# Patient Record
Sex: MALE | Race: OTHER | HISPANIC OR LATINO | URBAN - METROPOLITAN AREA
[De-identification: names, ages, dates, MRNs, and addresses within clinical notes are randomized per-mention and may not be internally consistent; named-entity substitution may affect disease eponyms.]

---

## 2017-12-06 ENCOUNTER — EMERGENCY (EMERGENCY)
Facility: HOSPITAL | Age: 57
LOS: 1 days | Discharge: HOME | End: 2017-12-06
Attending: EMERGENCY MEDICINE | Admitting: EMERGENCY MEDICINE
Payer: COMMERCIAL

## 2017-12-06 VITALS
DIASTOLIC BLOOD PRESSURE: 92 MMHG | HEART RATE: 112 BPM | OXYGEN SATURATION: 96 % | SYSTOLIC BLOOD PRESSURE: 140 MMHG | RESPIRATION RATE: 22 BRPM | TEMPERATURE: 99 F

## 2017-12-06 DIAGNOSIS — Z79.899 OTHER LONG TERM (CURRENT) DRUG THERAPY: ICD-10-CM

## 2017-12-06 DIAGNOSIS — J40 BRONCHITIS, NOT SPECIFIED AS ACUTE OR CHRONIC: ICD-10-CM

## 2017-12-06 DIAGNOSIS — J45.901 UNSPECIFIED ASTHMA WITH (ACUTE) EXACERBATION: ICD-10-CM

## 2017-12-06 PROCEDURE — 99285 EMERGENCY DEPT VISIT HI MDM: CPT | Mod: 25

## 2017-12-06 NOTE — ED ADULT TRIAGE NOTE - CHIEF COMPLAINT QUOTE
pt c/o productive cough ( green sputum ) , wheezing , SOB , no chest pain , pt states " I am breathing heavy and last year when I felt like this I had bronchitis "

## 2017-12-07 VITALS
DIASTOLIC BLOOD PRESSURE: 91 MMHG | HEART RATE: 103 BPM | OXYGEN SATURATION: 97 % | TEMPERATURE: 98 F | RESPIRATION RATE: 20 BRPM | SYSTOLIC BLOOD PRESSURE: 147 MMHG

## 2017-12-07 PROCEDURE — 94640 AIRWAY INHALATION TREATMENT: CPT

## 2017-12-07 PROCEDURE — 96374 THER/PROPH/DIAG INJ IV PUSH: CPT

## 2017-12-07 PROCEDURE — 36415 COLL VENOUS BLD VENIPUNCTURE: CPT

## 2017-12-07 PROCEDURE — 71020: CPT | Mod: 26

## 2017-12-07 PROCEDURE — 87040 BLOOD CULTURE FOR BACTERIA: CPT

## 2017-12-07 PROCEDURE — 71046 X-RAY EXAM CHEST 2 VIEWS: CPT

## 2017-12-07 PROCEDURE — 83605 ASSAY OF LACTIC ACID: CPT

## 2017-12-07 PROCEDURE — 99285 EMERGENCY DEPT VISIT HI MDM: CPT | Mod: 25

## 2017-12-07 PROCEDURE — 80053 COMPREHEN METABOLIC PANEL: CPT

## 2017-12-07 PROCEDURE — 85025 COMPLETE CBC W/AUTO DIFF WBC: CPT

## 2017-12-07 RX ORDER — IPRATROPIUM BROMIDE 0.2 MG/ML
500 SOLUTION, NON-ORAL INHALATION ONCE
Qty: 0 | Refills: 0 | Status: COMPLETED | OUTPATIENT
Start: 2017-12-07 | End: 2017-12-07

## 2017-12-07 RX ORDER — ALBUTEROL 90 UG/1
2.5 AEROSOL, METERED ORAL
Qty: 0 | Refills: 0 | Status: COMPLETED | OUTPATIENT
Start: 2017-12-07 | End: 2017-12-07

## 2017-12-07 RX ORDER — ALBUTEROL 90 UG/1
1 AEROSOL, METERED ORAL
Qty: 1 | Refills: 0 | OUTPATIENT
Start: 2017-12-07

## 2017-12-07 RX ORDER — AZITHROMYCIN 500 MG/1
1 TABLET, FILM COATED ORAL
Qty: 3 | Refills: 0 | OUTPATIENT
Start: 2017-12-07 | End: 2017-12-10

## 2017-12-07 RX ORDER — SODIUM CHLORIDE 9 MG/ML
1000 INJECTION INTRAMUSCULAR; INTRAVENOUS; SUBCUTANEOUS ONCE
Qty: 0 | Refills: 0 | Status: COMPLETED | OUTPATIENT
Start: 2017-12-07 | End: 2017-12-07

## 2017-12-07 RX ORDER — AZITHROMYCIN 500 MG/1
500 TABLET, FILM COATED ORAL ONCE
Qty: 0 | Refills: 0 | Status: COMPLETED | OUTPATIENT
Start: 2017-12-07 | End: 2017-12-07

## 2017-12-07 RX ADMIN — ALBUTEROL 2.5 MILLIGRAM(S): 90 AEROSOL, METERED ORAL at 00:45

## 2017-12-07 RX ADMIN — ALBUTEROL 2.5 MILLIGRAM(S): 90 AEROSOL, METERED ORAL at 00:00

## 2017-12-07 RX ADMIN — SODIUM CHLORIDE 1000 MILLILITER(S): 9 INJECTION INTRAMUSCULAR; INTRAVENOUS; SUBCUTANEOUS at 01:17

## 2017-12-07 RX ADMIN — Medication 125 MILLIGRAM(S): at 00:19

## 2017-12-07 RX ADMIN — Medication 500 MICROGRAM(S): at 00:00

## 2017-12-07 RX ADMIN — AZITHROMYCIN 500 MILLIGRAM(S): 500 TABLET, FILM COATED ORAL at 00:19

## 2017-12-07 RX ADMIN — ALBUTEROL 2.5 MILLIGRAM(S): 90 AEROSOL, METERED ORAL at 00:31

## 2017-12-07 NOTE — ED ADULT NURSE NOTE - OBJECTIVE STATEMENT
56 y/o male with hx of bronchitis arrived to [facility] ER reporting productive cough(green sputum), wheezing, SOB for the past couple hours. Pt verbalized that the last time he had these symptoms he had bronchitis last year. Upon assessment, rhonchi noted to lung fields WNL, breathing is equal and unlabored, pulses palpable, no visible injuries noted. Pt denies chest pain, headache, dizziness, blurred vision, slurred speech, nausea, vomiting, diarrhea, fever, chills, LOC, weakness, fatigue, and palpitations. Care in progress. Awaiting disposition

## 2017-12-07 NOTE — ED PROVIDER NOTE - ATTENDING CONTRIBUTION TO CARE
57 yom hx of asthma, pw cough, congestion.  occasional smoker.    agree w/ PA, mild tachycardia (though after nebulizer usage), speaking in full sentences, no rales, no wheezing, no respiratory distress, possibly viral bronchitis exacerbating RAD.

## 2020-01-12 NOTE — ED PROVIDER NOTE - ENMT, MLM
Airway patent, Nasal mucosa clear. Mouth with normal mucosa. Throat has no vesicles, no oropharyngeal exudates and uvula is midline. 11-Jan-2020

## 2024-12-31 ENCOUNTER — INPATIENT (INPATIENT)
Facility: HOSPITAL | Age: 64
LOS: 0 days | Discharge: HOME | DRG: 193 | End: 2025-01-01
Attending: INTERNAL MEDICINE | Admitting: INTERNAL MEDICINE
Payer: COMMERCIAL

## 2024-12-31 VITALS
OXYGEN SATURATION: 90 % | DIASTOLIC BLOOD PRESSURE: 97 MMHG | HEART RATE: 133 BPM | TEMPERATURE: 100 F | SYSTOLIC BLOOD PRESSURE: 138 MMHG | RESPIRATION RATE: 30 BRPM | WEIGHT: 261.91 LBS

## 2024-12-31 DIAGNOSIS — J11.1 INFLUENZA DUE TO UNIDENTIFIED INFLUENZA VIRUS WITH OTHER RESPIRATORY MANIFESTATIONS: ICD-10-CM

## 2024-12-31 DIAGNOSIS — K42.9 UMBILICAL HERNIA WITHOUT OBSTRUCTION OR GANGRENE: ICD-10-CM

## 2024-12-31 DIAGNOSIS — R65.10 SYSTEMIC INFLAMMATORY RESPONSE SYNDROME (SIRS) OF NON-INFECTIOUS ORIGIN WITHOUT ACUTE ORGAN DYSFUNCTION: ICD-10-CM

## 2024-12-31 DIAGNOSIS — I10 ESSENTIAL (PRIMARY) HYPERTENSION: ICD-10-CM

## 2024-12-31 DIAGNOSIS — J45.901 UNSPECIFIED ASTHMA WITH (ACUTE) EXACERBATION: ICD-10-CM

## 2024-12-31 DIAGNOSIS — K40.90 UNILATERAL INGUINAL HERNIA, WITHOUT OBSTRUCTION OR GANGRENE, NOT SPECIFIED AS RECURRENT: ICD-10-CM

## 2024-12-31 LAB
ADD ON TEST-SPECIMEN IN LAB: SIGNIFICANT CHANGE UP
ALBUMIN SERPL ELPH-MCNC: 4.1 G/DL — SIGNIFICANT CHANGE UP (ref 3.3–5)
ALP SERPL-CCNC: 69 U/L — SIGNIFICANT CHANGE UP (ref 40–120)
ALT FLD-CCNC: 24 U/L — SIGNIFICANT CHANGE UP (ref 10–45)
ANION GAP SERPL CALC-SCNC: 11 MMOL/L — SIGNIFICANT CHANGE UP (ref 5–17)
AST SERPL-CCNC: 63 U/L — HIGH (ref 10–40)
BASOPHILS # BLD AUTO: 0.01 K/UL — SIGNIFICANT CHANGE UP (ref 0–0.2)
BASOPHILS NFR BLD AUTO: 0.1 % — SIGNIFICANT CHANGE UP (ref 0–2)
BILIRUB SERPL-MCNC: 0.2 MG/DL — SIGNIFICANT CHANGE UP (ref 0.2–1.2)
BUN SERPL-MCNC: 20 MG/DL — SIGNIFICANT CHANGE UP (ref 7–23)
CALCIUM SERPL-MCNC: 8.7 MG/DL — SIGNIFICANT CHANGE UP (ref 8.4–10.5)
CHLORIDE SERPL-SCNC: 100 MMOL/L — SIGNIFICANT CHANGE UP (ref 96–108)
CO2 SERPL-SCNC: 24 MMOL/L — SIGNIFICANT CHANGE UP (ref 22–31)
CREAT SERPL-MCNC: 1.11 MG/DL — SIGNIFICANT CHANGE UP (ref 0.5–1.3)
EGFR: 75 ML/MIN/1.73M2 — SIGNIFICANT CHANGE UP
EOSINOPHIL # BLD AUTO: 0 K/UL — SIGNIFICANT CHANGE UP (ref 0–0.5)
EOSINOPHIL NFR BLD AUTO: 0 % — SIGNIFICANT CHANGE UP (ref 0–6)
FLUAV AG NPH QL: DETECTED
FLUBV AG NPH QL: SIGNIFICANT CHANGE UP
GAS PNL BLDV: SIGNIFICANT CHANGE UP
GLUCOSE SERPL-MCNC: 102 MG/DL — HIGH (ref 70–99)
HCT VFR BLD CALC: 46 % — SIGNIFICANT CHANGE UP (ref 39–50)
HGB BLD-MCNC: 15.4 G/DL — SIGNIFICANT CHANGE UP (ref 13–17)
IMM GRANULOCYTES NFR BLD AUTO: 0.3 % — SIGNIFICANT CHANGE UP (ref 0–0.9)
LACTATE SERPL-SCNC: 1.9 MMOL/L — SIGNIFICANT CHANGE UP (ref 0.5–2)
LYMPHOCYTES # BLD AUTO: 0.66 K/UL — LOW (ref 1–3.3)
LYMPHOCYTES # BLD AUTO: 8.9 % — LOW (ref 13–44)
MCHC RBC-ENTMCNC: 30.4 PG — SIGNIFICANT CHANGE UP (ref 27–34)
MCHC RBC-ENTMCNC: 33.5 G/DL — SIGNIFICANT CHANGE UP (ref 32–36)
MCV RBC AUTO: 90.9 FL — SIGNIFICANT CHANGE UP (ref 80–100)
MONOCYTES # BLD AUTO: 0.97 K/UL — HIGH (ref 0–0.9)
MONOCYTES NFR BLD AUTO: 13.1 % — SIGNIFICANT CHANGE UP (ref 2–14)
NEUTROPHILS # BLD AUTO: 5.74 K/UL — SIGNIFICANT CHANGE UP (ref 1.8–7.4)
NEUTROPHILS NFR BLD AUTO: 77.6 % — HIGH (ref 43–77)
NRBC # BLD: 0 /100 WBCS — SIGNIFICANT CHANGE UP (ref 0–0)
NT-PROBNP SERPL-SCNC: 58 PG/ML — SIGNIFICANT CHANGE UP (ref 0–300)
PLATELET # BLD AUTO: 317 K/UL — SIGNIFICANT CHANGE UP (ref 150–400)
POTASSIUM SERPL-MCNC: 4 MMOL/L — SIGNIFICANT CHANGE UP (ref 3.5–5.3)
POTASSIUM SERPL-SCNC: 4 MMOL/L — SIGNIFICANT CHANGE UP (ref 3.5–5.3)
PROT SERPL-MCNC: 8.1 G/DL — SIGNIFICANT CHANGE UP (ref 6–8.3)
RBC # BLD: 5.06 M/UL — SIGNIFICANT CHANGE UP (ref 4.2–5.8)
RBC # FLD: 14.4 % — SIGNIFICANT CHANGE UP (ref 10.3–14.5)
RSV RNA NPH QL NAA+NON-PROBE: SIGNIFICANT CHANGE UP
SARS-COV-2 RNA SPEC QL NAA+PROBE: SIGNIFICANT CHANGE UP
SODIUM SERPL-SCNC: 135 MMOL/L — SIGNIFICANT CHANGE UP (ref 135–145)
WBC # BLD: 7.4 K/UL — SIGNIFICANT CHANGE UP (ref 3.8–10.5)
WBC # FLD AUTO: 7.4 K/UL — SIGNIFICANT CHANGE UP (ref 3.8–10.5)

## 2024-12-31 PROCEDURE — 71045 X-RAY EXAM CHEST 1 VIEW: CPT | Mod: 26

## 2024-12-31 PROCEDURE — 99223 1ST HOSP IP/OBS HIGH 75: CPT | Mod: GC

## 2024-12-31 PROCEDURE — 93010 ELECTROCARDIOGRAM REPORT: CPT

## 2024-12-31 PROCEDURE — 74177 CT ABD & PELVIS W/CONTRAST: CPT | Mod: 26,MC

## 2024-12-31 PROCEDURE — 99285 EMERGENCY DEPT VISIT HI MDM: CPT

## 2024-12-31 RX ORDER — PREDNISONE 5 MG
40 TABLET ORAL DAILY
Refills: 0 | Status: DISCONTINUED | OUTPATIENT
Start: 2025-01-01 | End: 2025-01-01

## 2024-12-31 RX ORDER — IPRATROPIUM BROMIDE AND ALBUTEROL SULFATE .5; 2.5 MG/3ML; MG/3ML
3 SOLUTION RESPIRATORY (INHALATION) EVERY 6 HOURS
Refills: 0 | Status: DISCONTINUED | OUTPATIENT
Start: 2024-12-31 | End: 2025-01-01

## 2024-12-31 RX ORDER — SODIUM CHLORIDE 9 MG/ML
1000 INJECTION, SOLUTION INTRAMUSCULAR; INTRAVENOUS; SUBCUTANEOUS ONCE
Refills: 0 | Status: COMPLETED | OUTPATIENT
Start: 2024-12-31 | End: 2024-12-31

## 2024-12-31 RX ORDER — ACETAMINOPHEN 80 MG/.8ML
975 SOLUTION/ DROPS ORAL ONCE
Refills: 0 | Status: COMPLETED | OUTPATIENT
Start: 2024-12-31 | End: 2025-01-01

## 2024-12-31 RX ORDER — MAGNESIUM SULFATE 500 MG/ML
2 INJECTION, SOLUTION INTRAMUSCULAR; INTRAVENOUS ONCE
Refills: 0 | Status: COMPLETED | OUTPATIENT
Start: 2024-12-31 | End: 2024-12-31

## 2024-12-31 RX ORDER — IPRATROPIUM BROMIDE AND ALBUTEROL SULFATE .5; 2.5 MG/3ML; MG/3ML
3 SOLUTION RESPIRATORY (INHALATION) ONCE
Refills: 0 | Status: COMPLETED | OUTPATIENT
Start: 2024-12-31 | End: 2024-12-31

## 2024-12-31 RX ORDER — IOHEXOL 350 MG/ML
30 INJECTION, SOLUTION INTRAVENOUS ONCE
Refills: 0 | Status: COMPLETED | OUTPATIENT
Start: 2024-12-31 | End: 2024-12-31

## 2024-12-31 RX ORDER — METHYLPREDNISOLONE 4 MG/1
125 TABLET ORAL ONCE
Refills: 0 | Status: COMPLETED | OUTPATIENT
Start: 2024-12-31 | End: 2024-12-31

## 2024-12-31 RX ORDER — OSELTAMIVIR 75 MG/1
75 CAPSULE ORAL
Refills: 0 | Status: DISCONTINUED | OUTPATIENT
Start: 2024-12-31 | End: 2025-01-01

## 2024-12-31 RX ORDER — ACETAMINOPHEN 80 MG/.8ML
650 SOLUTION/ DROPS ORAL ONCE
Refills: 0 | Status: COMPLETED | OUTPATIENT
Start: 2024-12-31 | End: 2024-12-31

## 2024-12-31 RX ADMIN — SODIUM CHLORIDE 1000 MILLILITER(S): 9 INJECTION, SOLUTION INTRAMUSCULAR; INTRAVENOUS; SUBCUTANEOUS at 18:42

## 2024-12-31 RX ADMIN — IPRATROPIUM BROMIDE AND ALBUTEROL SULFATE 3 MILLILITER(S): .5; 2.5 SOLUTION RESPIRATORY (INHALATION) at 17:19

## 2024-12-31 RX ADMIN — ACETAMINOPHEN 650 MILLIGRAM(S): 80 SOLUTION/ DROPS ORAL at 18:02

## 2024-12-31 RX ADMIN — MAGNESIUM SULFATE 2 GRAM(S): 500 INJECTION, SOLUTION INTRAMUSCULAR; INTRAVENOUS at 18:02

## 2024-12-31 RX ADMIN — IOHEXOL 30 MILLILITER(S): 350 INJECTION, SOLUTION INTRAVENOUS at 18:40

## 2024-12-31 RX ADMIN — SODIUM CHLORIDE 1000 MILLILITER(S): 9 INJECTION, SOLUTION INTRAMUSCULAR; INTRAVENOUS; SUBCUTANEOUS at 17:29

## 2024-12-31 RX ADMIN — OSELTAMIVIR 75 MILLIGRAM(S): 75 CAPSULE ORAL at 19:52

## 2024-12-31 RX ADMIN — IPRATROPIUM BROMIDE AND ALBUTEROL SULFATE 3 MILLILITER(S): .5; 2.5 SOLUTION RESPIRATORY (INHALATION) at 17:29

## 2024-12-31 RX ADMIN — METHYLPREDNISOLONE 125 MILLIGRAM(S): 4 TABLET ORAL at 17:29

## 2024-12-31 RX ADMIN — MAGNESIUM SULFATE 150 GRAM(S): 500 INJECTION, SOLUTION INTRAMUSCULAR; INTRAVENOUS at 17:29

## 2024-12-31 NOTE — H&P ADULT - NSHPLABSRESULTS_GEN_ALL_CORE
.  LABS:                         15.4   7.40  )-----------( 317      ( 31 Dec 2024 17:39 )             46.0     12-31    135  |  100  |  20  ----------------------------<  102[H]  4.0   |  24  |  1.11    Ca    8.7      31 Dec 2024 17:39    TPro  8.1  /  Alb  4.1  /  TBili  0.2  /  DBili  x   /  AST  63[H]  /  ALT  24  /  AlkPhos  69  12-31      Urinalysis Basic - ( 31 Dec 2024 17:39 )    Color: x / Appearance: x / SG: x / pH: x  Gluc: 102 mg/dL / Ketone: x  / Bili: x / Urobili: x   Blood: x / Protein: x / Nitrite: x   Leuk Esterase: x / RBC: x / WBC x   Sq Epi: x / Non Sq Epi: x / Bacteria: x            Lactate, Blood: 1.9 mmol/L (12-31 @ 17:39)      RADIOLOGY, EKG & ADDITIONAL TESTS: Reviewed.

## 2024-12-31 NOTE — H&P ADULT - PROBLEM SELECTOR PLAN 4
Home meds: pt states that he is on amlodipine, unsure of dose  plan  - will hold acute asthma exacerbation 2/2 parainfluenza s/p Mag 2 mg IV 2,Solumedrol 125 mg IV push x1, Tamiflu 75 mg x1, Nacl 1000 ml, Duonebs, pt intubated as a child, meets SIRS critera 3/4Temp 100.4, , RR 30  plan  - will c/w  Tamiflu 75 mg BID

## 2024-12-31 NOTE — ED PROVIDER NOTE - CARE PLAN
Principal Discharge DX:	Influenza  Secondary Diagnosis:	Acute asthma exacerbation  Secondary Diagnosis:	Umbilical hernia  Secondary Diagnosis:	Hernia, inguinal   1

## 2024-12-31 NOTE — H&P ADULT - PROBLEM SELECTOR PLAN 6
Eliu states that he has a umbilical and inguinal hernia for roughly 20 years, he states that he does not have any abdominal pain, normal bowel and bladder movements, Large right inguinal hernia measuring 12 x 12 cm (3/198) which includes the right ureter (3/165, 5/52). There is mild right hydroureter of the section in the hernia sac as well as mild right hydronephrosis near the ureteropelvic junction without evidence of obstructing stone,  There is mild surrounding omental fat stranding, correlate clinically for incarceration, Left sided fat containing inguinal hernia measuring 4.7 x 4.4 cm  plan  - general surgery consult Eliu states that he has a umbilical and inguinal hernia for roughly 20 years, he states that he does not have any abdominal pain, normal bowel and bladder movements, CT Fat-containing umbilical hernia measuring 6.3 x 9.5 x 9.4 cm. There is mild surrounding omental fat stranding, correlate clinically for incarceration,  plan  - general surgery consult Patinet states that he has a umbilical and inguinal hernia for roughly 20 years, he states that he does not have any abdominal pain, normal bowel and bladder movements, CT Fat-containing umbilical hernia measuring 6.3 x 9.5 x 9.4 cm. There is mild surrounding omental fat stranding, correlate clinically for incarceration,  plan  - f/u with Gen surg was consulted in the ED and will follow during admission, no acute surgical intervention indication.

## 2024-12-31 NOTE — H&P ADULT - NSHPPHYSICALEXAM_GEN_ALL_CORE
CONSTITUTIONAL: Well groomed, no apparent distress  EYES: PERRLA and symmetric, EOMI, No conjunctival or scleral injection, non-icteric  ENMT: Oral mucosa with moist membranes. Normal dentition; no pharyngeal injection or exudates             NECK: Supple, symmetric and without tracheal deviation   RESP: No respiratory distress, no use of accessory muscles; CTA b/l, no WRR  CV: RRR, +S1S2, no MRG; no JVD; no peripheral edema  GI: Soft, NT, ND, no rebound, no guarding; no palpable masses; no hepatosplenomegaly; no hernia palpated  LYMPH: No cervical LAD or tenderness; no axillary LAD or tenderness; no inguinal LAD or tenderness  MSK: Normal gait; No digital clubbing or cyanosis; examination of the (head/neck/spine/ribs/pelvis, RUE, LUE, RLE, LLE) without misalignment,            Normal ROM without pain, no spinal tenderness, normal muscle strength/tone  SKIN: No rashes or ulcers noted; no subcutaneous nodules or induration palpable  NEURO: CN II-XII intact; normal reflexes in upper and lower extremities, sensation intact in upper and lower extremities b/l to light touch   PSYCH: Appropriate insight/judgment; A+O x 3, mood and affect appropriate, recent/remote memory intact CONSTITUTIONAL: Well groomed, no apparent distress  EYES: PERRLA and symmetric, EOMI, No conjunctival or scleral injection, non-icteric  ENMT: Oral mucosa with moist membranes. Normal dentition; no pharyngeal injection or exudates  RESP:  Increased WOB, diminished BS bilaterally with end exp wheezes, no use of accessory muscles; CTA b/l, no WRR  CV: RRR, +S1S2, no MRG; no JVD; no peripheral edema  GI: Umbilcial hernia noted, non reducible, Soft, NT, ND, no rebound, no guarding; no palpable masses; no hepatosplenomegaly; no hernia palpated  MSK: Normal ROM without pain, no spinal tenderness, normal muscle strength/tone  NEURO: sensation intact in upper and lower extremities b/l to light touch   PSYCH: Appropriate insight/judgment; A+O x 3, mood and affect appropriate, recent/remote memory intact

## 2024-12-31 NOTE — ED PROVIDER NOTE - ACUTE OR EVOLVING MI?
Complete medication as ordered, complete chest x-ray and blood work as ordered return in 2 weeks    Complete cessation of smoking is recommended no

## 2024-12-31 NOTE — H&P ADULT - PROBLEM SELECTOR PLAN 3
acute asthma exacerbation 2/2 parainfluenza s/p Mag 2 mg IV 2,Solumedrol 125 mg IV push x1, Tamiflu 75 mg x1, Nacl 1000 ml, Duonebs, pt intubated as a child, meets SIRS critera 3/4Temp 100.4, , RR 30  plan  - will c/w  Tamiflu 75 mg BID Pt presents with SOB, wheezing, found to have acute asthma exacerbation 2/2 parainfluenza, meets SIRS critera 3/4Temp 100.4, , RR 30  plan  - stated above

## 2024-12-31 NOTE — ED PROVIDER NOTE - CLINICAL SUMMARY MEDICAL DECISION MAKING FREE TEXT BOX
64M with a PMHx of HTN, asthma  (no intubations), umbilical and inguinal hernias who p/w flu-like sx and shortness of breath associated with worsening pain and discoloration of umbilical hernia. Pt was at [facility] ER and reports having CT scan and US that were negative for blood clots, he was treated for asthma and admitted but left AMA yesterday bc he never got a bed and was tired of sitting in a chair. He was using his albuterol  machine at home with no relief today so came to  ER today. Also reports decreased Po intake. No f/c, no n/v, no dizziness or syncope. Here with wife.   VS notable for tachycardia and hypoxia to 90s on RA, pt with b/l EEW, exam also notable for umbilical hernia TTP and soft b/l inguinal hernias.  EKG SInus tach, BBB, no stemi.   Plan for labs, CXR, IVFs, solu-medrol, duonebs, IV mag. Will also get CT a/p to eval hernias.  labs with no emergent findings, CXR neg for infiltrate or effusion.   Pt breathing imporved with tx in the ER but pt still requiring supp O2 and found to be flu A+, tamiflu ordered.   CT a/p show fat containing umbilical hernia with possible incarceration and fat containing b/l inguinal hernias, R hernia also contains part of ureter with mod hydroureter. Gen surg was consulted and will follow during admission to medicine for flu and asthma. Pt stable for RMF at this time. 64M with a PMHx of HTN, asthma  (no intubations), umbilical and inguinal hernias who p/w flu-like sx and shortness of breath associated with worsening pain and discoloration of umbilical hernia. Pt was at [facility] ER and reports having CT scan and US that were negative for blood clots, he was treated for asthma and admitted but left AMA yesterday bc he never got a bed and was tired of sitting in a chair. He was using his albuterol  machine at home with no relief today so came to  ER today. Also reports decreased Po intake. No f/c, no n/v, no dizziness or syncope. Here with wife.   VS notable for tachycardia and hypoxia to 90s on RA,, tachypnea to 30 and temp 100.4,  pt with b/l EEW, exam also notable for umbilical hernia TTP and soft b/l inguinal hernias.  EKG SInus tach, BBB, no stemi.   Plan for labs, CXR, IVFs, solu-medrol, duonebs, IV mag. Will also get CT a/p to eval hernias.  labs with no emergent findings, CXR neg for infiltrate or effusion.   Pt breathing improved with tx in the ER but pt still requiring supp O2 and found to be flu A+, tamiflu ordered.   CT a/p show fat containing umbilical hernia with possible incarceration and fat containing b/l inguinal hernias, R hernia also contains part of ureter with mod hydroureter. Gen surg was consulted and will follow during admission to medicine for flu and asthma, no acute surgical intervention indication. Pt stable for RMF at this time with gen surg on consult.

## 2024-12-31 NOTE — H&P ADULT - PROBLEM SELECTOR PLAN 1
Pt presents with SOB, wheezing, found to have acute asthma exacerbation 2/2 parainfluenza s/p Mag 2 mg IV 2,Solumedrol 125 mg IV push x1, Tamiflu 75 mg x1, Nacl 1000 ml, Radha, pt intubated as a child  plan  - will c/w radha  - will start prednisone 40 mg qd 5 days  - peak flow bedside 125, obtain daily peak flow  - s/p magnesium 2 grams  - Tylenol prn Pt presents with SOB, wheezing, found to have acute asthma exacerbation 2/2 parainfluenza s/p Mag 2 mg IV 2,Solumedrol 125 mg IV push x1, Tamiflu 75 mg x1, Nacl 1000 ml, Domingo, pt intubated as a child  plan  - will c/w tinyonebs  - will start prednisone 40 mg qd 5 days  - will start symbicort  - peak flow bedside 125, obtain daily peak flow  - s/p magnesium 2 grams  - Tylenol prn

## 2024-12-31 NOTE — H&P ADULT - ASSESSMENT
64M with a PMHx of HTN, asthma  (no intubations), umbilical and inguinal hernias who p/w flu-like sx and shortness of breath associated with worsening pain and discoloration of umbilical hernia. Pt was at [facility] ER and reports having CT scan and US that were negative for blood clots, he was treated for asthma and admitted but left AMA yesterday bc he never got a bed and was tired of sitting in a chair. He was using his albuterol  machine at home with no relief today so came to  ER today. Also reports decreased Po intake. No f/c, no n/v, no dizziness or syncope. Here with wife.    Vitals: Temp 100.4  /97, , RR 30  Labs CBC wbc wnl mild elevation in NTP, cmp ast 63, influenza +  Imaging: CT abd/pel: Large right inguinal hernia measuring 12 x 12 cm (3/198) which includes the right ureter (3/165, 5/52). There is mild right hydroureter of the section in the hernia sac as well as mild right hydronephrosis near the   ureteropelvic junction without evidence of obstructing stone, Fat-containing umbilical hernia measuring 6.3 x 9.5 x 9.4 cm. There is mild surrounding omental fat stranding, correlate clinically for incarceration, Left sided fat containing inguinal hernia measuring 4.7 x 4.4 cm, Healing fracture of the left posterolateral eighth rib.  Interventions: omnipaque 30 ml. tylenol 650 mg, Mag 2 mg IV 2,Solumedrol 125 mg IV push x1, Tamiflu 75 mg x1, Nacl 1000 ml, Domingo       64M with a PMHx of HTN, asthma  (no intubations), umbilical and inguinal hernias who presents with sob, wheezing, and cough, found to have acute asthma exacerbation 2/2 parainfluenza, general surgery consulted in the ED for inguinal and umbilcial hernias, admitted to medicine for management asthma exacerbation.

## 2024-12-31 NOTE — ED ADULT TRIAGE NOTE - INTERNATIONAL TRAVEL
Enrico BROOKS  from [facility] calling in.  She is asking for a new prescription of magnesium hydroxide (MILK OF MAGNESIA) 400 MG/5ML suspension .    She is asking for either 5ml or 15 ml.  Since the TMA's pass the medication at the facility they can not do the dosing.      Please Fax to [facility]    FAX: [phone]      
Faxed new Rx back to [facility] at [phone].    Confirmed delivery via RightFax.  
In MA box for faxing       Thank you,    Bo Craft MD    
Per Enrico RN from [facility] she is needing a dose for the Magnesium hydroxide.  She states that TMA's pass the medication so they do not do the dosing.      Need to Fax dose to [facility]   [phone]    Ebonie Wright RN  [facility] ~ Registered Nurse  Clinic Triage ~ [place] [facility] [place]  December 20, 2022    
Prescription printed and placed in MA outbox for faxing      Thank you,    Bo Craft MD    
Rx faxed to [facility] at [phone].    Confirmed delivery via RightFax.   
TODD Montaño calling from [facility]  PH: [phone]  FAX: [phone]    Facility is requesting a prescription to be faxed to the number above for constipation.       MARIANA AlcarazN, RN, PHN  [place]  December 20, 2022    
No

## 2024-12-31 NOTE — H&P ADULT - HISTORY OF PRESENT ILLNESS
64M with a PMHx of HTN, asthma  (no intubations), umbilical and inguinal hernias who p/w flu-like sx and shortness of breath associated with worsening pain and discoloration of umbilical hernia. Pt was at [facility] ER and reports having CT scan and US that were negative for blood clots, he was treated for asthma and admitted but left AMA yesterday bc he never got a bed and was tired of sitting in a chair. He was using his albuterol  machine at home with no relief today so came to  ER today. Also reports decreased Po intake. No f/c, no n/v, no dizziness or syncope. Here with wife.    Vitals: Temp 100.4  /97, , RR 30  Labs CBC wbc wnl mild elevation in NTP, cmp ast 63, influenza +  Imaging: CT abd/pel: Large right inguinal hernia measuring 12 x 12 cm (3/198) which includes the right ureter (3/165, 5/52). There is mild right hydroureter of the section in the hernia sac as well as mild right hydronephrosis near the   ureteropelvic junction without evidence of obstructing stone, Fat-containing umbilical hernia measuring 6.3 x 9.5 x 9.4 cm. There is mild surrounding omental fat stranding, correlate clinically for incarceration, Left sided fat containing inguinal hernia measuring 4.7 x 4.4 cm, Healing fracture of the left posterolateral eighth rib.  Interventions     64M with a PMHx of HTN, asthma  (no intubations), umbilical and inguinal hernias who p/w flu-like sx and shortness of breath associated with worsening pain and discoloration of umbilical hernia. Pt was at [facility] ER and reports having CT scan and US that were negative for blood clots, he was treated for asthma and admitted but left AMA yesterday bc he never got a bed and was tired of sitting in a chair. He was using his albuterol  machine at home with no relief today so came to  ER today. Also reports decreased Po intake. No f/c, no n/v, no dizziness or syncope. Here with wife.    Vitals: Temp 100.4  /97, , RR 30  Labs CBC wbc wnl mild elevation in NTP, cmp ast 63, influenza +  Imaging: CT abd/pel: Large right inguinal hernia measuring 12 x 12 cm (3/198) which includes the right ureter (3/165, 5/52). There is mild right hydroureter of the section in the hernia sac as well as mild right hydronephrosis near the   ureteropelvic junction without evidence of obstructing stone, Fat-containing umbilical hernia measuring 6.3 x 9.5 x 9.4 cm. There is mild surrounding omental fat stranding, correlate clinically for incarceration, Left sided fat containing inguinal hernia measuring 4.7 x 4.4 cm, Healing fracture of the left posterolateral eighth rib.  Interventions: omnipaque 30 ml. tylenol 650 mg, Mag 2 mg IV 2,Solumedrol 125 mg IV push x1, Tamiflu 75 mg x1, Nacl 1000 ml, Domingo     64M with a PMHx of HTN, asthma  (no intubations), umbilical and inguinal hernias who presents with sob, wheezing, and cough,    Patient states that for the last 8-9 weeks he has been having symptoms on and off related to SOB, wheezing, and cough that produces dark green color associated with poor po intake. Patient states that he went to [facility] 2 days ago due and was supposed to be admitted, however did not have the pateint to wait and left ama. When the patient returned home, he started to have symptoms and decided to come into the hospital. Pt states that he has had asthma when he was kid and was intubated once as a child. Patinet states that he has a umbilicial and inguinal hernia for roughly 20 years, he states that he does not have any abdominal pain, normal bowel and bladder movements. Patient denies chest pain, abdominal pain, n/v/d, no dizziness or syncope. Patient denies recent travel and has not had any sick contacts.    Allergies: None  SH: lives with wife, pt does not drink or smoke  FH: mom passed away from ovarian cancer    Vitals: Temp 100.4  /97, , RR 30  Labs CBC wbc wnl mild elevation in NTP, cmp ast 63, influenza +  Imaging: CT abd/pel: Large right inguinal hernia measuring 12 x 12 cm (3/198) which includes the right ureter (3/165, 5/52). There is mild right hydroureter of the section in the hernia sac as well as mild right hydronephrosis near the   ureteropelvic junction without evidence of obstructing stone, Fat-containing umbilical hernia measuring 6.3 x 9.5 x 9.4 cm. There is mild surrounding omental fat stranding, correlate clinically for incarceration, Left sided fat containing inguinal hernia measuring 4.7 x 4.4 cm, Healing fracture of the left posterolateral eighth rib.  Interventions: omnipaque 30 ml. tylenol 650 mg, Mag 2 mg IV 2,Solumedrol 125 mg IV push x1, Tamiflu 75 mg x1, Nacl 1000 ml, Duonebs

## 2024-12-31 NOTE — ED ADULT NURSE REASSESSMENT NOTE - NS ED NURSE REASSESS COMMENT FT1
Received report from day shift RN. Pt resting in chair comfortably, resp even and unlabored, speaking in clear/complete sent. Pending CT. Remains on O2

## 2024-12-31 NOTE — ED ADULT TRIAGE NOTE - CHIEF COMPLAINT QUOTE
Pt c/o asthma exacerbation. Denies hx of intubation, not currently to steroids. Talking in short sentences.
03-Aug-2023 15:10

## 2024-12-31 NOTE — ED PROVIDER NOTE - PHYSICAL EXAMINATION
GEN: Well appearing, well developed, awake, alert, oriented to person, place, time/situation and in no apparent distress. NTAF  ENT: Airway patent, Nasal mucosa clear. Mouth with normal mucosa.  EYES: Clear bilaterally. PERRL, EOMI  RESPIRATORY: Increased WOB, diminished BS bilaterally with end exp wheezes, satting 90% on RA, improved with NC.   CARDIAC: Regular  rhythm, sinus tachycardia.   ABDOMEN: Obese, +umbilical hernia, TTP with reddish, purple discoloration, unable to reduce at bedside, b/l large inguinal hernias, R.L, +bowel sounds, no rebound, rigidity, or guarding.  MSK: Range of motion is not limited, no deformities noted.  NEURO: Alert and oriented, no focal deficits.  SKIN: Skin normal color for race, warm, dry and intact. No evidence of rash.  PSYCH: Alert and oriented to person, place, time/situation. normal mood and affect. no apparent risk to self or others.

## 2024-12-31 NOTE — ED PROVIDER NOTE - OBJECTIVE STATEMENT
64M with a PMHx of HTN, asthma  (no intubations), umbilical and inguinal hernias who p/w flu-like sx and shortness of breath associated with worsening pain and discoloration of umbilical hernia. Pt was at [facility] ER and reports having CT scan and US that were negative for blood clots, he was treated for asthma and admitted but left AMA yesterday bc he never got a bed and was tired of sitting in a chair. He was using his albuterol  machine at home with no relief today so came to  ER today. Also reports decreased Po intake. No f/c, no n/v, no dizziness or syncope. Here with wife.

## 2024-12-31 NOTE — H&P ADULT - PROBLEM SELECTOR PLAN 7
Eliu states that he has a umbilical and inguinal hernia for roughly 20 years, he states that he does not have any abdominal pain, normal bowel and bladder movements, Large right inguinal hernia measuring 12 x 12 cm (3/198) which includes the right ureter (3/165, 5/52). There is mild right hydroureter of the section in the hernia sac as well as mild right hydronephrosis near the ureteropelvic junction without evidence of obstructing stone,  There is mild surrounding omental fat stranding, correlate clinically for incarceration, Left sided fat containing inguinal hernia measuring 4.7 x 4.4 cm  plan  - general surgery consult Patievans states that he has a umbilical and inguinal hernia for roughly 20 years, he states that he does not have any abdominal pain, normal bowel and bladder movements, Large right inguinal hernia measuring 12 x 12 cm (3/198) which includes the right ureter (3/165, 5/52). There is mild right hydroureter of the section in the hernia sac as well as mild right hydronephrosis near the ureteropelvic junction without evidence of obstructing stone,  There is mild surrounding omental fat stranding, correlate clinically for incarceration, Left sided fat containing inguinal hernia measuring 4.7 x 4.4 cm  plan  - f/u with Gen surg was consulted in the ED and will follow during admission, no acute surgical intervention indication.

## 2024-12-31 NOTE — H&P ADULT - PROBLEM SELECTOR PLAN 2
Pt presents with SOB, wheezing, found to have acute asthma exacerbation 2/2 parainfluenza, meets SIRS critera 3/4Temp 100.4, , RR 30  plan  - stated above Pt presents with SOB, wheezing, found to have acute asthma exacerbation 2/2 parainfluenza,placed on 4 L nc may be multifactorial 2/2 asthma and atelactaosis vs PE  plan  - currently on 4L nc, wean as tolerated  - Obtain collateral from Jose Gunter, pt states that he had CT scan and US that were negative for blood clots  - f/c cxr  - bedside ICS

## 2024-12-31 NOTE — ED ADULT NURSE NOTE - OBJECTIVE STATEMENT
Presents for c/o asthma exacerbation x 1 week with admission to OSH Saturday, ama'ed when was not given bed, worsening x 2 days.     Pt upgraded from triage with immediate ED RN and MD team at bedside. Placed on CM, EKG obtained, PIV established with labs sent. Medicated per verbal orders as reflected in MAR. Pt remains on CM, resting on stretcher, stable. Requiring supp 02 at 4L with spo2 94 as documented.

## 2024-12-31 NOTE — H&P ADULT - ATTENDING COMMENTS
64M with a PMHx of HTN, asthma  (no intubations), umbilical and inguinal hernias who presents with sob, wheezing, and cough, found to have acute asthma exacerbation 2/2 parainfluenza, general surgery consulted in the ED for inguinal and umbilcial hernias, admitted to medicine for management asthma exacerbation.    Pt seen at bedside in ED. feels much improved s/p dounebs/steroids. currently not SOB, no CP. has mild abdominal pain only w/ coughing. denies fevers/chills, headache, NV, diarhea, urinary symptoms. on PE: resting comfortably in bed on NC, no iwobacessory muscle use. +MMM, PERRLA, no jvd, s1/s2, no murmur, CTA b/l lung fields. abd soft, +large umbilical hernia, not reducible, mild erythema/tenderness. no rebound. no LE edema.     Plan   -c/w dounebs q4hrs. prednisone 40mg x5d   -wean O2 as tolerated   -collateral from [place] regarding CTA chest   -trend peak flow   -f/up surgery recs, SAEs   -start cough suppressants, IS

## 2024-12-31 NOTE — ED ADULT NURSE NOTE - CHIEF COMPLAINT QUOTE
Pt c/o asthma exacerbation. Denies hx of intubation, not currently to steroids. Talking in short sentences.
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
## 2024-12-31 NOTE — H&P ADULT - PROBLEM SELECTOR PLAN 5
Eliu states that he has a umbilical and inguinal hernia for roughly 20 years, he states that he does not have any abdominal pain, normal bowel and bladder movements, CT Fat-containing umbilical hernia measuring 6.3 x 9.5 x 9.4 cm. There is mild surrounding omental fat stranding, correlate clinically for incarceration,  plan  - general surgery consult Home meds: pt states that he is on amlodipine, unsure of dose  plan  - will hold

## 2025-01-01 ENCOUNTER — TRANSCRIPTION ENCOUNTER (OUTPATIENT)
Age: 65
End: 2025-01-01

## 2025-01-01 VITALS
DIASTOLIC BLOOD PRESSURE: 80 MMHG | TEMPERATURE: 98 F | SYSTOLIC BLOOD PRESSURE: 132 MMHG | HEART RATE: 86 BPM | OXYGEN SATURATION: 93 % | RESPIRATION RATE: 19 BRPM

## 2025-01-01 DIAGNOSIS — J96.01 ACUTE RESPIRATORY FAILURE WITH HYPOXIA: ICD-10-CM

## 2025-01-01 LAB
ANION GAP SERPL CALC-SCNC: 10 MMOL/L — SIGNIFICANT CHANGE UP (ref 5–17)
BASOPHILS # BLD AUTO: 0 K/UL — SIGNIFICANT CHANGE UP (ref 0–0.2)
BASOPHILS NFR BLD AUTO: 0 % — SIGNIFICANT CHANGE UP (ref 0–2)
BUN SERPL-MCNC: 20 MG/DL — SIGNIFICANT CHANGE UP (ref 7–23)
CALCIUM SERPL-MCNC: 8.1 MG/DL — LOW (ref 8.4–10.5)
CHLORIDE SERPL-SCNC: 103 MMOL/L — SIGNIFICANT CHANGE UP (ref 96–108)
CO2 SERPL-SCNC: 24 MMOL/L — SIGNIFICANT CHANGE UP (ref 22–31)
CREAT SERPL-MCNC: 1.08 MG/DL — SIGNIFICANT CHANGE UP (ref 0.5–1.3)
EGFR: 77 ML/MIN/1.73M2 — SIGNIFICANT CHANGE UP
EOSINOPHIL # BLD AUTO: 0 K/UL — SIGNIFICANT CHANGE UP (ref 0–0.5)
EOSINOPHIL NFR BLD AUTO: 0 % — SIGNIFICANT CHANGE UP (ref 0–6)
GLUCOSE SERPL-MCNC: 101 MG/DL — HIGH (ref 70–99)
HCT VFR BLD CALC: 42.3 % — SIGNIFICANT CHANGE UP (ref 39–50)
HGB BLD-MCNC: 14.1 G/DL — SIGNIFICANT CHANGE UP (ref 13–17)
IMM GRANULOCYTES NFR BLD AUTO: 0.2 % — SIGNIFICANT CHANGE UP (ref 0–0.9)
LYMPHOCYTES # BLD AUTO: 0.61 K/UL — LOW (ref 1–3.3)
LYMPHOCYTES # BLD AUTO: 12.8 % — LOW (ref 13–44)
MAGNESIUM SERPL-MCNC: 2.4 MG/DL — SIGNIFICANT CHANGE UP (ref 1.6–2.6)
MCHC RBC-ENTMCNC: 30.9 PG — SIGNIFICANT CHANGE UP (ref 27–34)
MCHC RBC-ENTMCNC: 33.3 G/DL — SIGNIFICANT CHANGE UP (ref 32–36)
MCV RBC AUTO: 92.8 FL — SIGNIFICANT CHANGE UP (ref 80–100)
MONOCYTES # BLD AUTO: 0.46 K/UL — SIGNIFICANT CHANGE UP (ref 0–0.9)
MONOCYTES NFR BLD AUTO: 9.6 % — SIGNIFICANT CHANGE UP (ref 2–14)
NEUTROPHILS # BLD AUTO: 3.7 K/UL — SIGNIFICANT CHANGE UP (ref 1.8–7.4)
NEUTROPHILS NFR BLD AUTO: 77.4 % — HIGH (ref 43–77)
NRBC # BLD: 0 /100 WBCS — SIGNIFICANT CHANGE UP (ref 0–0)
PHOSPHATE SERPL-MCNC: 4.4 MG/DL — SIGNIFICANT CHANGE UP (ref 2.5–4.5)
PLATELET # BLD AUTO: 279 K/UL — SIGNIFICANT CHANGE UP (ref 150–400)
POTASSIUM SERPL-MCNC: 4.6 MMOL/L — SIGNIFICANT CHANGE UP (ref 3.5–5.3)
POTASSIUM SERPL-SCNC: 4.6 MMOL/L — SIGNIFICANT CHANGE UP (ref 3.5–5.3)
RBC # BLD: 4.56 M/UL — SIGNIFICANT CHANGE UP (ref 4.2–5.8)
RBC # FLD: 14.6 % — HIGH (ref 10.3–14.5)
SODIUM SERPL-SCNC: 137 MMOL/L — SIGNIFICANT CHANGE UP (ref 135–145)
WBC # BLD: 4.78 K/UL — SIGNIFICANT CHANGE UP (ref 3.8–10.5)
WBC # FLD AUTO: 4.78 K/UL — SIGNIFICANT CHANGE UP (ref 3.8–10.5)

## 2025-01-01 PROCEDURE — 36415 COLL VENOUS BLD VENIPUNCTURE: CPT

## 2025-01-01 PROCEDURE — 80048 BASIC METABOLIC PNL TOTAL CA: CPT

## 2025-01-01 PROCEDURE — 96361 HYDRATE IV INFUSION ADD-ON: CPT

## 2025-01-01 PROCEDURE — 71045 X-RAY EXAM CHEST 1 VIEW: CPT

## 2025-01-01 PROCEDURE — 84132 ASSAY OF SERUM POTASSIUM: CPT

## 2025-01-01 PROCEDURE — 84295 ASSAY OF SERUM SODIUM: CPT

## 2025-01-01 PROCEDURE — 84100 ASSAY OF PHOSPHORUS: CPT

## 2025-01-01 PROCEDURE — 99239 HOSP IP/OBS DSCHRG MGMT >30: CPT

## 2025-01-01 PROCEDURE — 93005 ELECTROCARDIOGRAM TRACING: CPT

## 2025-01-01 PROCEDURE — 80053 COMPREHEN METABOLIC PANEL: CPT

## 2025-01-01 PROCEDURE — 99285 EMERGENCY DEPT VISIT HI MDM: CPT

## 2025-01-01 PROCEDURE — 74177 CT ABD & PELVIS W/CONTRAST: CPT | Mod: MC

## 2025-01-01 PROCEDURE — 83605 ASSAY OF LACTIC ACID: CPT

## 2025-01-01 PROCEDURE — 96365 THER/PROPH/DIAG IV INF INIT: CPT

## 2025-01-01 PROCEDURE — 83880 ASSAY OF NATRIURETIC PEPTIDE: CPT

## 2025-01-01 PROCEDURE — 85025 COMPLETE CBC W/AUTO DIFF WBC: CPT

## 2025-01-01 PROCEDURE — 94640 AIRWAY INHALATION TREATMENT: CPT

## 2025-01-01 PROCEDURE — 82330 ASSAY OF CALCIUM: CPT

## 2025-01-01 PROCEDURE — 83735 ASSAY OF MAGNESIUM: CPT

## 2025-01-01 PROCEDURE — 96375 TX/PRO/DX INJ NEW DRUG ADDON: CPT

## 2025-01-01 PROCEDURE — 84484 ASSAY OF TROPONIN QUANT: CPT

## 2025-01-01 PROCEDURE — 82803 BLOOD GASES ANY COMBINATION: CPT

## 2025-01-01 PROCEDURE — 87637 SARSCOV2&INF A&B&RSV AMP PRB: CPT

## 2025-01-01 RX ORDER — IPRATROPIUM BROMIDE AND ALBUTEROL SULFATE .5; 2.5 MG/3ML; MG/3ML
3 SOLUTION RESPIRATORY (INHALATION)
Qty: 1 | Refills: 2
Start: 2025-01-01

## 2025-01-01 RX ORDER — BENZONATATE 100 MG
1 CAPSULE ORAL
Qty: 21 | Refills: 0
Start: 2025-01-01 | End: 2025-01-07

## 2025-01-01 RX ORDER — FLUTICASONE PROPIONATE AND SALMETEROL 50; 500 UG/1; UG/1
1 POWDER ORAL; RESPIRATORY (INHALATION)
Refills: 0 | Status: DISCONTINUED | OUTPATIENT
Start: 2025-01-01 | End: 2025-01-01

## 2025-01-01 RX ORDER — BENZONATATE 100 MG
100 CAPSULE ORAL THREE TIMES A DAY
Refills: 0 | Status: DISCONTINUED | OUTPATIENT
Start: 2025-01-01 | End: 2025-01-01

## 2025-01-01 RX ORDER — FLUTICASONE PROPIONATE AND SALMETEROL 50; 500 UG/1; UG/1
1 POWDER ORAL; RESPIRATORY (INHALATION)
Qty: 1 | Refills: 0
Start: 2025-01-01

## 2025-01-01 RX ORDER — OSELTAMIVIR 75 MG/1
1 CAPSULE ORAL
Qty: 0 | Refills: 0 | DISCHARGE
Start: 2025-01-01

## 2025-01-01 RX ORDER — FLUTICASONE PROPIONATE 44 MCG
1 AEROSOL WITH ADAPTER (GRAM) INHALATION
Qty: 1 | Refills: 0
Start: 2025-01-01

## 2025-01-01 RX ORDER — OSELTAMIVIR 75 MG/1
1 CAPSULE ORAL
Qty: 6 | Refills: 0
Start: 2025-01-01 | End: 2025-01-03

## 2025-01-01 RX ORDER — ENOXAPARIN SODIUM 60 MG/.6ML
40 INJECTION INTRAVENOUS; SUBCUTANEOUS EVERY 24 HOURS
Refills: 0 | Status: DISCONTINUED | OUTPATIENT
Start: 2025-01-01 | End: 2025-01-01

## 2025-01-01 RX ORDER — ALBUTEROL SULFATE 90 UG/1
2 INHALANT RESPIRATORY (INHALATION)
Qty: 1 | Refills: 0
Start: 2025-01-01

## 2025-01-01 RX ORDER — PREDNISONE 5 MG
2 TABLET ORAL
Qty: 6 | Refills: 0
Start: 2025-01-01 | End: 2025-01-03

## 2025-01-01 RX ADMIN — FLUTICASONE PROPIONATE AND SALMETEROL 1 DOSE(S): 50; 500 POWDER ORAL; RESPIRATORY (INHALATION) at 09:31

## 2025-01-01 RX ADMIN — Medication 100 MILLIGRAM(S): at 05:43

## 2025-01-01 RX ADMIN — IPRATROPIUM BROMIDE AND ALBUTEROL SULFATE 3 MILLILITER(S): .5; 2.5 SOLUTION RESPIRATORY (INHALATION) at 10:28

## 2025-01-01 RX ADMIN — IPRATROPIUM BROMIDE AND ALBUTEROL SULFATE 3 MILLILITER(S): .5; 2.5 SOLUTION RESPIRATORY (INHALATION) at 01:00

## 2025-01-01 RX ADMIN — OSELTAMIVIR 75 MILLIGRAM(S): 75 CAPSULE ORAL at 05:43

## 2025-01-01 RX ADMIN — Medication 100 MILLIGRAM(S): at 13:41

## 2025-01-01 RX ADMIN — IPRATROPIUM BROMIDE AND ALBUTEROL SULFATE 3 MILLILITER(S): .5; 2.5 SOLUTION RESPIRATORY (INHALATION) at 06:24

## 2025-01-01 RX ADMIN — ENOXAPARIN SODIUM 40 MILLIGRAM(S): 60 INJECTION INTRAVENOUS; SUBCUTANEOUS at 05:43

## 2025-01-01 NOTE — DISCHARGE NOTE PROVIDER - ATTENDING DISCHARGE PHYSICAL EXAMINATION:
64M with a PMHx of HTN, asthma  (no intubations), umbilical and inguinal hernias who presents with sob, wheezing, and cough, found to have acute asthma exacerbation 2/2 parainfluenza, general surgery consulted in the ED for inguinal and umbilcial hernias, admitted to medicine for management asthma exacerbation.    Pt seen at bedside in ED. feels much improved s/p dounebs/steroids. currently not SOB, no CP. has mild abdominal pain only w/ coughing to LUQ. on PE: resting comfortably in bed on NC, no iwobacessory muscle use. +MMM, PERRLA, no jvd, s1/s2, no murmur, CTA b/l lung fields. abd soft, +large umbilical hernia, not reducible, mild erythema/tenderness. no rebound. no LE edema.     Stable for d/c with close outpatient

## 2025-01-01 NOTE — DISCHARGE NOTE NURSING/CASE MANAGEMENT/SOCIAL WORK - FINANCIAL ASSISTANCE
[facility] provides services at a reduced cost to those who are determined to be eligible through [facility]’s financial assistance program. Information regarding [facility]’s financial assistance program can be found by going to [email or web address] or by calling [phone].

## 2025-01-01 NOTE — DISCHARGE NOTE NURSING/CASE MANAGEMENT/SOCIAL WORK - PATIENT PORTAL LINK FT
You can access the FollowMyHealth Patient Portal offered by [facility] by registering at the following website: [email or web address]. By joining [employer]’s FollowMyHealth portal, you will also be able to view your health information using other applications (apps) compatible with our system.

## 2025-01-01 NOTE — DISCHARGE NOTE PROVIDER - HOSPITAL COURSE
64M with a PMHx of HTN, asthma  (no intubations), umbilical and inguinal hernias who presents with sob, wheezing, and cough, found to have acute asthma exacerbation 2/2 parainfluenza, general surgery consulted in the ED for inguinal and umbilcial hernias, admitted to medicine for management asthma exacerbation.    Hospital course (by problem):     Acute asthma exacerbation.   ·  Plan: Pt presents with SOB, wheezing, found to have acute asthma exacerbation 2/2 parainfluenza s/p Mag 2 mg IV 2,Solumedrol 125 mg IV push x1, Tamiflu 75 mg x1, Nacl 1000 ml, Duonebs, pt intubated as a child  plan     - will c/w duonebs  - will start prednisone 40 mg qd 5 days  - will start symbicort.     Problem/Plan - 2:  ·  Problem: Acute hypoxic respiratory failure.   ·  Plan: Pt presents with SOB, wheezing, found to have acute asthma exacerbation 2/2 parainfluenza,placed on 4 L nc may be multifactorial 2/2 asthma and atelactaosis vs PE  plan    - Obtain collateral from [facility], pt states that he had CT scan and US that were negative for blood clots  - bedside ICS.     Problem/Plan - 3:  ·  Problem: Influenza.   ·  Plan: acute asthma exacerbation 2/2 parainfluenza s/p Mag 2 mg IV 2,Solumedrol 125 mg IV push x1, Tamiflu 75 mg x1, Nacl 1000 ml, Duonebs, pt intubated as a child, meets SIRS critera 3/4Temp 100.4, , RR 30  plan  - will c/w  Tamiflu 75 mg BID.     Problem/Plan - 4:  ·  Problem: HTN (hypertension).   ·  Plan: Home meds: pt states that he is on amlodipine, unsure of dose  plan  - will hold.     Problem/Plan - 5:  ·  Problem: Inguinal hernia.   ·  Plan: Patinet states that he has a umbilical and inguinal hernia for roughly 20 years, he states that he does not have any abdominal pain, normal bowel and bladder movements, CT Fat-containing umbilical hernia measuring 6.3 x 9.5 x 9.4 cm. There is mild surrounding omental fat stranding, correlate clinically for incarceration,  plan  - Surgery with no plan to intervene, will f/u outpatient if he wishes for elective repair.     Problem/Plan - 6:  ·  Problem: Umbilical hernia.   ·  Plan: Eliu states that he has a umbilical and inguinal hernia for roughly 20 years, he states that he does not have any abdominal pain, normal bowel and bladder movements, Large right inguinal hernia measuring 12 x 12 cm (3/198) which includes the right ureter (3/165, 5/52). There is mild right hydroureter of the section in the hernia sac as well as mild right hydronephrosis near the ureteropelvic junction without evidence of obstructing stone,  There is mild surrounding omental fat stranding, correlate clinically for incarceration, Left sided fat containing inguinal hernia measuring 4.7 x 4.4 cm  plan  - Surgery with no plan to intervene, will f/u outpatient if he wishes for elective repair.    Patient was discharged to: Home    New medications: Advair Diskus, Oseltamivir, Benzonatate  Changes to old medications: None  Medications that were stopped: None    Items to follow up as outpatient: Pulmonary f/u within 2 weeks, outpatient PCP f/u in 1 week    Physical exam at the time of discharge: Alert and oriented x3, inguinal and ventral hernia present, no wheezing on exam, no peripheral edema, hernias are nontender but nonreducible. Heart rate normal, no murmurs.

## 2025-01-01 NOTE — PROGRESS NOTE ADULT - SUBJECTIVE AND OBJECTIVE BOX
TONYRUTHRODY  64y  Male      Patient is a 64y old  Male who presents with a chief complaint of ahrf (01 Jan 2025 06:58)        Notable Events:      REVIEW OF SYSTEMS:  CONSTITUTIONAL: No fever  EYES: No visual disturbances  ENMT: No hearing changes, No sore throat  RESPIRATORY: No cough, No shortness of breath  CARDIOVASCULAR: No chest pain, No palpitations  GASTROINTESTINAL: No abdominal pain, No nausea, No vomiting, No diarrhea, No melena, No hematochezia.  GENITOURINARY: No dysuria, frequency, hematuria, or incontinence  NEUROLOGICAL: No headaches, No weakness, No numbness, No tremors  SKIN: No lesions, No rashes  MUSCULOSKELETAL: No joint pain, No backpain, No extremity pain  PSYCHIATRIC: No depression, anxiety, or difficulty sleeping  FAMILY HISTORY:    Vital Signs Last 24 Hrs  T(C): 36.4 (01 Jan 2025 05:57), Max: 38 (31 Dec 2024 17:09)  T(F): 97.6 (01 Jan 2025 05:57), Max: 100.4 (31 Dec 2024 17:09)  HR: 85 (01 Jan 2025 05:57) (85 - 133)  BP: 134/80 (01 Jan 2025 05:57) (134/80 - 151/84)  BP(mean): 106 (01 Jan 2025 01:54) (106 - 106)  RR: 20 (01 Jan 2025 05:57) (20 - 30)  SpO2: 98% (01 Jan 2025 05:57) (90% - 98%)    Parameters below as of 01 Jan 2025 05:57  Patient On (Oxygen Delivery Method): nasal cannula  O2 Flow (L/min): 4    No Known Allergies      PHYSICAL EXAM:  GENERAL: No acute distress  HEAD:  Atraumatic, Normocephalic  EYES: Normal extraocular movements. Conjunctiva and sclera are normal  NECK: Supple. Normal thyroid. No lymphadenopathy  NERVOUS SYSTEM:  Alert & Oriented X3. Motor Strength 5/5 B/L upper and lower extremities; DTRs 2+ intact and symmetric.  CHEST/LUNG: No wheezing or crackles present.  CARDIAC: Regular rate and rhythm. No murmurs or rubs. Jugular venous pressure is normal.  ABDOMEN: Nontender. Nondistended. Soft abdomen.  EXTREMITIES:  2+ Peripheral Pulses, No clubbing, cyanosis, or edema.  SKIN: No rashes or lesions    Consultant(s) Notes Reviewed:  [x ] YES  [ ] NO  Care Discussed with Consultants/Other Providers [ x] YES  [ ] NO    LABS:                        15.4   7.40  )-----------( 317      ( 31 Dec 2024 17:39 )             46.0     12-31    135  |  100  |  20  ----------------------------<  102[H]  4.0   |  24  |  1.11    Ca    8.7      31 Dec 2024 17:39    TPro  8.1  /  Alb  4.1  /  TBili  0.2  /  DBili  x   /  AST  63[H]  /  ALT  24  /  AlkPhos  69  12-31          Urinalysis Basic - ( 31 Dec 2024 17:39 )    Color: x / Appearance: x / SG: x / pH: x  Gluc: 102 mg/dL / Ketone: x  / Bili: x / Urobili: x   Blood: x / Protein: x / Nitrite: x   Leuk Esterase: x / RBC: x / WBC x   Sq Epi: x / Non Sq Epi: x / Bacteria: x          RADIOLOGY & ADDITIONAL TESTS:    Imaging Personally Reviewed:  [x] YES  [ ] NO  albuterol/ipratropium for Nebulization 3 milliLiter(s) Nebulizer every 6 hours  benzonatate 100 milliGRAM(s) Oral three times a day  enoxaparin Injectable 40 milliGRAM(s) SubCutaneous every 24 hours  fluticasone propionate/ salmeterol 250-50 MICROgram(s) Diskus 1 Dose(s) Inhalation two times a day  oseltamivir 75 milliGRAM(s) Oral two times a day  predniSONE   Tablet 40 milliGRAM(s) Oral daily      HEALTH ISSUES - PROBLEM Dx:  HTN (hypertension)    Acute asthma exacerbation    Inguinal hernia    Umbilical hernia    Influenza    Systemic inflammatory response syndrome (SIRS)    Acute hypoxic respiratory failure           TONYRODY  64y  Male      Patient is a 64y old  Male who presents with a chief complaint of ahrf (01 Jan 2025 06:58)      Notable Events: Resting comfortably this morning outside of his paroxysms of pain. No wheezing this morning. Feels well outside of his cough.      REVIEW OF SYSTEMS:  CONSTITUTIONAL: No fever  EYES: No visual disturbances  ENMT: No hearing changes, No sore throat  RESPIRATORY: No cough, No shortness of breath  CARDIOVASCULAR: No chest pain, No palpitations  GASTROINTESTINAL: No abdominal pain, No nausea, No vomiting, No diarrhea, No melena, No hematochezia.  GENITOURINARY: No dysuria, frequency, hematuria, or incontinence  NEUROLOGICAL: No headaches, No weakness, No numbness, No tremors  SKIN: No lesions, No rashes  MUSCULOSKELETAL: No joint pain, No backpain, No extremity pain  PSYCHIATRIC: No depression, anxiety, or difficulty sleeping  FAMILY HISTORY:    Vital Signs Last 24 Hrs  T(C): 36.4 (01 Jan 2025 05:57), Max: 38 (31 Dec 2024 17:09)  T(F): 97.6 (01 Jan 2025 05:57), Max: 100.4 (31 Dec 2024 17:09)  HR: 85 (01 Jan 2025 05:57) (85 - 133)  BP: 134/80 (01 Jan 2025 05:57) (134/80 - 151/84)  BP(mean): 106 (01 Jan 2025 01:54) (106 - 106)  RR: 20 (01 Jan 2025 05:57) (20 - 30)  SpO2: 98% (01 Jan 2025 05:57) (90% - 98%)    Parameters below as of 01 Jan 2025 05:57  Patient On (Oxygen Delivery Method): nasal cannula  O2 Flow (L/min): 4    No Known Allergies      PHYSICAL EXAM:  GENERAL: No acute distress  HEAD:  Atraumatic, Normocephalic  EYES: Normal extraocular movements. Conjunctiva and sclera are normal  NECK: Supple. Normal thyroid. No lymphadenopathy  NERVOUS SYSTEM:  Alert & Oriented X3. Motor Strength 5/5 B/L upper and lower extremities; DTRs 2+ intact and symmetric.  CHEST/LUNG: No wheezing or crackles present.  CARDIAC: Regular rate and rhythm. No murmurs or rubs. Jugular venous pressure is normal.  ABDOMEN: Nontender. Nondistended. Soft abdomen.  EXTREMITIES:  2+ Peripheral Pulses, No clubbing, cyanosis, or edema.  SKIN: No rashes or lesions    Consultant(s) Notes Reviewed:  [x ] YES  [ ] NO  Care Discussed with Consultants/Other Providers [ x] YES  [ ] NO    LABS:                        15.4   7.40  )-----------( 317      ( 31 Dec 2024 17:39 )             46.0     12-31    135  |  100  |  20  ----------------------------<  102[H]  4.0   |  24  |  1.11    Ca    8.7      31 Dec 2024 17:39    TPro  8.1  /  Alb  4.1  /  TBili  0.2  /  DBili  x   /  AST  63[H]  /  ALT  24  /  AlkPhos  69  12-31          Urinalysis Basic - ( 31 Dec 2024 17:39 )    Color: x / Appearance: x / SG: x / pH: x  Gluc: 102 mg/dL / Ketone: x  / Bili: x / Urobili: x   Blood: x / Protein: x / Nitrite: x   Leuk Esterase: x / RBC: x / WBC x   Sq Epi: x / Non Sq Epi: x / Bacteria: x          RADIOLOGY & ADDITIONAL TESTS:    Imaging Personally Reviewed:  [x] YES  [ ] NO  albuterol/ipratropium for Nebulization 3 milliLiter(s) Nebulizer every 6 hours  benzonatate 100 milliGRAM(s) Oral three times a day  enoxaparin Injectable 40 milliGRAM(s) SubCutaneous every 24 hours  fluticasone propionate/ salmeterol 250-50 MICROgram(s) Diskus 1 Dose(s) Inhalation two times a day  oseltamivir 75 milliGRAM(s) Oral two times a day  predniSONE   Tablet 40 milliGRAM(s) Oral daily      HEALTH ISSUES - PROBLEM Dx:  HTN (hypertension)    Acute asthma exacerbation    Inguinal hernia    Umbilical hernia    Influenza    Systemic inflammatory response syndrome (SIRS)    Acute hypoxic respiratory failure

## 2025-01-01 NOTE — PROGRESS NOTE ADULT - ASSESSMENT
64M with a PMHx of HTN, asthma  (no intubations), umbilical and inguinal hernias who presents with sob, wheezing, and cough, found to have acute asthma exacerbation 2/2 parainfluenza, general surgery consulted in the ED for inguinal and umbilcial hernias, admitted to medicine for management asthma exacerbation.

## 2025-01-01 NOTE — DISCHARGE NOTE PROVIDER - NSDCMRMEDTOKEN_GEN_ALL_CORE_FT
Advair Diskus 250 mcg-50 mcg inhalation powder: 1 powder inhaled 2 times a day  benzonatate 200 mg oral capsule: 1 cap(s) orally 3 times a day as needed for  cough  fluticasone 250 mcg/inh inhalation powder: 1 inhaled 2 times a day Please use this inhaler  ibuprofen 800 mg oral tablet: 1 tab(s) orally every 8 hours  Nasonex 50 mcg/inh nasal spray: 2 spray(s) nasal once a day  oseltamivir 75 mg oral capsule: 1 cap(s) orally 2 times a day  predniSONE 20 mg oral tablet: 2 tab(s) orally once a day Please take twice a day, once in the morning and again around 3-4pm. May cause insomnia if taken late at night  Ventolin HFA 90 mcg/inh inhalation aerosol: 1 puff(s) inhaled 4 times a day MDD:PRN wheezing please dispense 1 pump  Ventolin HFA 90 mcg/inh inhalation aerosol: 2 aerosol inhaled 4 times a day as needed for  shortness of breath and/or wheezing   Advair Diskus 250 mcg-50 mcg inhalation powder: 1 powder inhaled 2 times a day  benzonatate 200 mg oral capsule: 1 cap(s) orally 3 times a day as needed for  cough  fluticasone 250 mcg/inh inhalation powder: 1 inhaled 2 times a day Please use this inhaler  ibuprofen 800 mg oral tablet: 1 tab(s) orally every 8 hours  ipratropium-albuterol 0.5 mg-2.5 mg/3 mL inhalation solution: 3 milliliter(s) by nebulizer 3 times a day as needed for  shortness of breath and/or wheezing  Nasonex 50 mcg/inh nasal spray: 2 spray(s) nasal once a day  oseltamivir 75 mg oral capsule: 1 cap(s) orally 2 times a day  predniSONE 20 mg oral tablet: 2 tab(s) orally once a day Please take twice a day, once in the morning and again around 3-4pm. May cause insomnia if taken late at night  Ventolin HFA 90 mcg/inh inhalation aerosol: 1 puff(s) inhaled 4 times a day MDD:PRN wheezing please dispense 1 pump  Ventolin HFA 90 mcg/inh inhalation aerosol: 2 aerosol inhaled 4 times a day as needed for  shortness of breath and/or wheezing

## 2025-01-01 NOTE — PROGRESS NOTE ADULT - PROBLEM SELECTOR PLAN 5
Eliu states that he has a umbilical and inguinal hernia for roughly 20 years, he states that he does not have any abdominal pain, normal bowel and bladder movements, CT Fat-containing umbilical hernia measuring 6.3 x 9.5 x 9.4 cm. There is mild surrounding omental fat stranding, correlate clinically for incarceration,  plan  - Surgery with no plan to intervene, will f/u outpatient if he wishes for elective repair

## 2025-01-01 NOTE — CONSULT NOTE ADULT - ASSESSMENT
63yo M w/ PMH HTN, asthma, chronic umbilical and inguinal hernia (>20yrs) and PSH open appendectomy (20yrs ago) presenting w/ 9w hx of sporadic SOB/congestion. Surgery consulted for large, chronic umbilical and R-inguinal hernia. In the ED, febrile 100,4, tachy 130s, tachypneic on RA now comfortable on 4LNC, normotensive. Labs sx for influenza A(+). On exam, large nontender umbilical hernia w/ no ovelrying skin changes, large R-sided nontender inguinal/scrotal hernia w/ no overlying skin changes. Pt states that he has had the hernias for >20yrs. Denies every being symptomatic, denies pain, nausea/vomiting, obstipation. At this time, umbilical + inguinal hernia asymptomatic and no radiographic or clinical signs of obstruction. Pt states that he would be interested in having his hernias repaired in an elective setting. Pt to be admitted to Medicine service for hypoexmia and SOB in setting of asthma exacerbation likely 2/2 influenza A.     - no acute surgical intervention or admission indicated at this time   - pt can f/u output in clinic w/ Dr. Singh for discussion of elective hernia repair   - can consider Urology vs IR consult for poss elevated Cr in setting of mild hydronephrosis   - pt states that he does not have a PCP, interested in establishing care at [facility]     Dr. Abdiel Singh MD   [address] [place] [address], [address], [place] [id number] [phone]    Surgery to sign off, please reconsult as needed.

## 2025-01-01 NOTE — CONSULT NOTE ADULT - SUBJECTIVE AND OBJECTIVE BOX
====SURGERY CONSULT====      RODY JIMENEZ  [id number]    HPI:   63yo M w/ PMH HTN, asthma, chronic umbilical and inguinal hernia (>20yrs) and PSH open appendectomy (20yrs ago) presenting w/ 9w hx of sporadic SOB/congestion. Pt states that over the passed 9w he has had intermittent SOB, states that 1d prior to presentation he was having inc congestion and difficulty taking a full breath which prompted his visit to the ED. Pt states that he has had his umbilical and inguinal hernias for >20yrs and has never had any pain assoc with them. Denies nausea/vomiting. Endorses passing flatus and having BMs regularly.       On exam,     In the ED, vitals febrile 100.4, tachy 130s, tachypneic satting 90 on RA now resolved satting 94 on 4L NC, normotensive  Pt received 1L NS bolus x1  Labs sx for (+)influenza A, otherwise wnl   CTAP sx for _______      PMH: asthma, HTN, chronic umbilical and inguinal hernia (>20yrs)  PSH: open appendectomy (20yrs ago)  Meds: amlodipine 20qd   Allergies: NKDA  C-scope: none, counseled on importance   EGD: none             LABS:                        15.4   7.40  )-----------( 317      ( 31 Dec 2024 17:39 )             46.0     12-31    135  |  100  |  20  ----------------------------<  102[H]  4.0   |  24  |  1.11    Ca    8.7      31 Dec 2024 17:39    TPro  8.1  /  Alb  4.1  /  TBili  0.2  /  DBili  x   /  AST  63[H]  /  ALT  24  /  AlkPhos  69  12-31                  GENERAL: NAD, lying in bed comfortably  HEAD:  Atraumatic, normocephalic  EYES: EOMI, PERRLA, conjunctiva and sclera clear  NECK: Supple, trachea midline, no JVD  HEART: Regular rate and rhythm, no murmurs, rubs, or gallops  LUNGS: Unlabored respirations.  Clear to auscultation bilaterally, no crackles, wheezing, or rhonchi  ABDOMEN: Soft, nontender, nondistended, large umbilical hernia soft w/ no overlying skin changes w/ no ttp   : large R-sided inguinal and scrotal hernia w/ no overlying skin changes w/ no ttp   EXTREMITIES: 2+ peripheral pulses bilaterally. No clubbing, cyanosis, or edema  NERVOUS SYSTEM:  A&Ox3, moving all extremities, no focal deficits   SKIN: No rashes or lesions

## 2025-01-01 NOTE — PROGRESS NOTE ADULT - PROBLEM SELECTOR PLAN 2
Pt presents with SOB, wheezing, found to have acute asthma exacerbation 2/2 parainfluenza,placed on 4 L nc may be multifactorial 2/2 asthma and atelactaosis vs PE  plan    - Obtain collateral from [facility], pt states that he had CT scan and US that were negative for blood clots  - bedside ICS

## 2025-01-01 NOTE — DISCHARGE NOTE PROVIDER - NSDCFUSCHEDAPPT_GEN_ALL_CORE_FT
[facility]  INTMED [address]  Scheduled Appointment: 01/09/2025    Anam Avalos  [facility]  PULMMED [address]  Scheduled Appointment: 01/13/2025

## 2025-01-01 NOTE — PROGRESS NOTE ADULT - PROBLEM SELECTOR PLAN 1
Pt presents with SOB, wheezing, found to have acute asthma exacerbation 2/2 parainfluenza s/p Mag 2 mg IV 2,Solumedrol 125 mg IV push x1, Tamiflu 75 mg x1, Nacl 1000 ml, Radha, pt intubated as a child  plan     - will c/w radha  - will start prednisone 40 mg qd 5 days  - will start symbicort

## 2025-01-01 NOTE — DISCHARGE NOTE PROVIDER - NSDCCPCAREPLAN_GEN_ALL_CORE_FT
PRINCIPAL DISCHARGE DIAGNOSIS  Diagnosis: Influenza  Assessment and Plan of Treatment: You were in the hospital for both an asthma exacerbation and influenza A. Your asthma was likely triggered by the influenza. Now that your breathing has improved, we feel safe to have you head home and continue monitoring yourself as you get better from the flu. Please drink plenty of water and make sure youre eating well.  For your asthma:  Please  your inhalers which I sent to your [facility] pharmacy. Ideally, I would like you to  Advair Diskus inhaler which is both a controller medication and a rescue therapy. What this means is, every morning you should make sure you are taking a big enough breath to cause deployment of the powder and continue to take a deep breath so that it is deposited in the lungs. You should also take this again at night. If throughout the day you are having symptoms and chest tightness, feel free to take the inhaler again.  If the pharmacy does not cover that particular inhaler, I also sent two other inhalers. Ventolin HFA and Fluticasone which is essentially the Advair diskus split into its two components. For this, you should take your fluticasone inhaler twice a day. If you have an exacerbation or shortness of breath, please take both your ventolin AND your fluticasone inhaler to relieve your shortness of breath.

## 2025-01-01 NOTE — DISCHARGE NOTE NURSING/CASE MANAGEMENT/SOCIAL WORK - NSDCFUADDAPPT_GEN_ALL_CORE_FT
Patient will follow up at the [facility] and with Pulmonary Medicine with Dr. Will Padilla    [facility]  Medicine at [address]  [phone]  Fax: [phone] [address], [address] [place], [place] [address]

## 2025-01-01 NOTE — DISCHARGE NOTE PROVIDER - PROVIDER TOKENS
stated PROVIDER:[TOKEN:[[id number]],FOLLOWUP:[2 weeks]] PROVIDER:[TOKEN:[[id number]],SCHEDULEDAPPT:[01/13/2025],SCHEDULEDAPPTTIME:[09:30 AM]],PROVIDER:[TOKEN:[[id number]],SCHEDULEDAPPT:[01/09/2025],SCHEDULEDAPPTTIME:[12:00 PM]]

## 2025-01-01 NOTE — PROGRESS NOTE ADULT - PROBLEM SELECTOR PLAN 3
acute asthma exacerbation 2/2 parainfluenza s/p Mag 2 mg IV 2,Solumedrol 125 mg IV push x1, Tamiflu 75 mg x1, Nacl 1000 ml, Duonebs, pt intubated as a child, meets SIRS critera 3/4Temp 100.4, , RR 30  plan  - will c/w  Tamiflu 75 mg BID

## 2025-01-01 NOTE — DISCHARGE NOTE PROVIDER - NSDCFUADDAPPT_GEN_ALL_CORE_FT
Patient will follow up at the [facility] Patient will follow up at the [facility] and with Pulmonary Medicine with Dr. Will Padilla Patient will follow up at the [facility] and with Pulmonary Medicine with Dr. Will Padilla    [facility] Physician [facility]  Medicine at [address]  [phone]  Fax: [phone] [address], [address] [place], [place] [address] Please bring your Insurance card, Photo ID and Discharge paperwork to the following appointments:    (1) Please follow up with your Pulmonary Medicine Provider, Dr. Anam Avalos at [address] [place] [address], [address] [place] [address] on 1/13/2025 at 9:30am.    Appointment was scheduled by Ms. KAIT Logan, Referral Coordinator.

## 2025-01-01 NOTE — PROGRESS NOTE ADULT - PROBLEM SELECTOR PLAN 6
Eliu states that he has a umbilical and inguinal hernia for roughly 20 years, he states that he does not have any abdominal pain, normal bowel and bladder movements, Large right inguinal hernia measuring 12 x 12 cm (3/198) which includes the right ureter (3/165, 5/52). There is mild right hydroureter of the section in the hernia sac as well as mild right hydronephrosis near the ureteropelvic junction without evidence of obstructing stone,  There is mild surrounding omental fat stranding, correlate clinically for incarceration, Left sided fat containing inguinal hernia measuring 4.7 x 4.4 cm  plan  - Surgery with no plan to intervene, will f/u outpatient if he wishes for elective repair

## 2025-01-01 NOTE — DISCHARGE NOTE PROVIDER - CARE PROVIDERS DIRECT ADDRESSES
,DirectAddress_Unknown ,[email or web address]

## 2025-01-08 DIAGNOSIS — J10.1 INFLUENZA DUE TO OTHER IDENTIFIED INFLUENZA VIRUS WITH OTHER RESPIRATORY MANIFESTATIONS: ICD-10-CM

## 2025-01-08 DIAGNOSIS — J96.01 ACUTE RESPIRATORY FAILURE WITH HYPOXIA: ICD-10-CM

## 2025-01-08 DIAGNOSIS — Z79.52 LONG TERM (CURRENT) USE OF SYSTEMIC STEROIDS: ICD-10-CM

## 2025-01-08 DIAGNOSIS — I10 ESSENTIAL (PRIMARY) HYPERTENSION: ICD-10-CM

## 2025-01-08 DIAGNOSIS — B34.8 OTHER VIRAL INFECTIONS OF UNSPECIFIED SITE: ICD-10-CM

## 2025-01-08 DIAGNOSIS — K40.90 UNILATERAL INGUINAL HERNIA, WITHOUT OBSTRUCTION OR GANGRENE, NOT SPECIFIED AS RECURRENT: ICD-10-CM

## 2025-01-08 DIAGNOSIS — K42.9 UMBILICAL HERNIA WITHOUT OBSTRUCTION OR GANGRENE: ICD-10-CM

## 2025-01-08 DIAGNOSIS — J45.901 UNSPECIFIED ASTHMA WITH (ACUTE) EXACERBATION: ICD-10-CM

## 2025-01-09 ENCOUNTER — APPOINTMENT (OUTPATIENT)
Age: 65
End: 2025-01-09

## 2025-01-14 ENCOUNTER — APPOINTMENT (OUTPATIENT)
Dept: PULMONOLOGY | Facility: CLINIC | Age: 65
End: 2025-01-14
Payer: COMMERCIAL

## 2025-01-14 ENCOUNTER — NON-APPOINTMENT (OUTPATIENT)
Age: 65
End: 2025-01-14

## 2025-01-14 VITALS
TEMPERATURE: 97.2 F | OXYGEN SATURATION: 96 % | SYSTOLIC BLOOD PRESSURE: 147 MMHG | DIASTOLIC BLOOD PRESSURE: 91 MMHG | WEIGHT: 262 LBS | HEIGHT: 65 IN | HEART RATE: 102 BPM | BODY MASS INDEX: 43.65 KG/M2

## 2025-01-14 DIAGNOSIS — B34.8 OTHER VIRAL INFECTIONS OF UNSPECIFIED SITE: ICD-10-CM

## 2025-01-14 PROBLEM — J45.909 ASTHMA: Status: ACTIVE | Noted: 2025-01-14

## 2025-01-14 PROCEDURE — 99205 OFFICE O/P NEW HI 60 MIN: CPT

## 2025-01-14 RX ORDER — IPRATROPIUM BROMIDE AND ALBUTEROL SULFATE 2.5; .5 MG/3ML; MG/3ML
0.5-2.5 (3) SOLUTION RESPIRATORY (INHALATION)
Qty: 30 | Refills: 3 | Status: ACTIVE | COMMUNITY
Start: 2025-01-14 | End: 1900-01-01

## 2025-01-14 RX ORDER — FLUTICASONE FUROATE AND VILANTEROL TRIFENATATE 200; 25 UG/1; UG/1
200-25 POWDER RESPIRATORY (INHALATION) DAILY
Qty: 1 | Refills: 1 | Status: ACTIVE | COMMUNITY
Start: 2025-01-14 | End: 1900-01-01

## 2025-01-14 RX ORDER — GUAIFENESIN 100 MG/5ML
100 LIQUID ORAL EVERY 4 HOURS
Qty: 1 | Refills: 0 | Status: ACTIVE | COMMUNITY
Start: 2025-01-14 | End: 1900-01-01

## 2025-01-14 RX ORDER — FLUTICASONE PROPIONATE 50 UG/1
50 SPRAY, METERED NASAL DAILY
Qty: 1 | Refills: 3 | Status: ACTIVE | COMMUNITY
Start: 2025-01-14 | End: 1900-01-01

## 2025-01-14 RX ORDER — ALBUTEROL SULFATE 90 UG/1
108 (90 BASE) INHALANT RESPIRATORY (INHALATION)
Qty: 1 | Refills: 3 | Status: ACTIVE | COMMUNITY
Start: 2025-01-14 | End: 1900-01-01

## 2025-01-21 ENCOUNTER — APPOINTMENT (OUTPATIENT)
Dept: INTERNAL MEDICINE | Facility: CLINIC | Age: 65
End: 2025-01-21
Payer: COMMERCIAL

## 2025-01-21 VITALS — HEART RATE: 105 BPM | DIASTOLIC BLOOD PRESSURE: 108 MMHG | TEMPERATURE: 98.1 F | SYSTOLIC BLOOD PRESSURE: 168 MMHG

## 2025-01-21 VITALS — BODY MASS INDEX: 43.82 KG/M2 | WEIGHT: 263 LBS | HEIGHT: 65 IN

## 2025-01-21 DIAGNOSIS — Z12.11 ENCOUNTER FOR SCREENING FOR MALIGNANT NEOPLASM OF COLON: ICD-10-CM

## 2025-01-21 DIAGNOSIS — Z00.00 ENCOUNTER FOR GENERAL ADULT MEDICAL EXAMINATION W/OUT ABNORMAL FINDINGS: ICD-10-CM

## 2025-01-21 DIAGNOSIS — K42.9 UMBILICAL HERNIA W/OUT OBSTRUCTION OR GANGRENE: ICD-10-CM

## 2025-01-21 DIAGNOSIS — K40.90 UNILATERAL INGUINAL HERNIA, W/OUT OBSTRUCTION OR GANGRENE, NOT SPECIFIED AS RECURRENT: ICD-10-CM

## 2025-01-21 DIAGNOSIS — I10 ESSENTIAL (PRIMARY) HYPERTENSION: ICD-10-CM

## 2025-01-21 DIAGNOSIS — R00.0 TACHYCARDIA, UNSPECIFIED: ICD-10-CM

## 2025-01-21 DIAGNOSIS — J45.909 UNSPECIFIED ASTHMA, UNCOMPLICATED: ICD-10-CM

## 2025-01-21 PROCEDURE — 36415 COLL VENOUS BLD VENIPUNCTURE: CPT

## 2025-01-21 PROCEDURE — 93000 ELECTROCARDIOGRAM COMPLETE: CPT

## 2025-01-21 PROCEDURE — 99386 PREV VISIT NEW AGE 40-64: CPT | Mod: 25

## 2025-01-21 PROCEDURE — 99204 OFFICE O/P NEW MOD 45 MIN: CPT | Mod: 25,GC

## 2025-01-21 RX ORDER — VALSARTAN AND HYDROCHLOROTHIAZIDE 80; 12.5 MG/1; MG/1
80-12.5 TABLET, FILM COATED ORAL DAILY
Qty: 30 | Refills: 0 | Status: ACTIVE | COMMUNITY
Start: 2025-01-21 | End: 1900-01-01

## 2025-01-23 ENCOUNTER — APPOINTMENT (OUTPATIENT)
Dept: PULMONOLOGY | Facility: CLINIC | Age: 65
End: 2025-01-23

## 2025-01-23 LAB
ALBUMIN SERPL ELPH-MCNC: 4.2 G/DL
ALP BLD-CCNC: 78 U/L
ALT SERPL-CCNC: 23 U/L
ANION GAP SERPL CALC-SCNC: 12 MMOL/L
AST SERPL-CCNC: 21 U/L
BASOPHILS # BLD AUTO: 0.01 K/UL
BASOPHILS NFR BLD AUTO: 0.3 %
BILIRUB SERPL-MCNC: 0.3 MG/DL
BUN SERPL-MCNC: 9 MG/DL
CALCIUM SERPL-MCNC: 9.5 MG/DL
CHLORIDE SERPL-SCNC: 104 MMOL/L
CHOLEST SERPL-MCNC: 148 MG/DL
CO2 SERPL-SCNC: 26 MMOL/L
CREAT SERPL-MCNC: 1.13 MG/DL
CREAT SPEC-SCNC: 147 MG/DL
EGFR: 73 ML/MIN/1.73M2
EOSINOPHIL # BLD AUTO: 0.04 K/UL
EOSINOPHIL NFR BLD AUTO: 1.1 %
ESTIMATED AVERAGE GLUCOSE: 120 MG/DL
GLUCOSE SERPL-MCNC: 87 MG/DL
HBA1C MFR BLD HPLC: 5.8 %
HCT VFR BLD CALC: 46.3 %
HDLC SERPL-MCNC: 39 MG/DL
HGB BLD-MCNC: 14.8 G/DL
IMM GRANULOCYTES NFR BLD AUTO: 0 %
LDLC SERPL CALC-MCNC: 82 MG/DL
LYMPHOCYTES # BLD AUTO: 1.25 K/UL
LYMPHOCYTES NFR BLD AUTO: 35.2 %
MAN DIFF?: NORMAL
MCHC RBC-ENTMCNC: 30.6 PG
MCHC RBC-ENTMCNC: 32 G/DL
MCV RBC AUTO: 95.7 FL
MICROALBUMIN 24H UR DL<=1MG/L-MCNC: 5.5 MG/DL
MICROALBUMIN/CREAT 24H UR-RTO: 38 MG/G
MONOCYTES # BLD AUTO: 0.25 K/UL
MONOCYTES NFR BLD AUTO: 7 %
NEUTROPHILS # BLD AUTO: 2 K/UL
NEUTROPHILS NFR BLD AUTO: 56.4 %
NONHDLC SERPL-MCNC: 108 MG/DL
PLATELET # BLD AUTO: 315 K/UL
POTASSIUM SERPL-SCNC: 4.5 MMOL/L
PROT SERPL-MCNC: 7.9 G/DL
PSA SERPL-MCNC: 9.63 NG/ML
RBC # BLD: 4.84 M/UL
RBC # FLD: 15.1 %
SODIUM SERPL-SCNC: 142 MMOL/L
TRIGL SERPL-MCNC: 151 MG/DL
TSH SERPL-ACNC: 0.8 UIU/ML
WBC # FLD AUTO: 3.55 K/UL

## 2025-03-19 ENCOUNTER — APPOINTMENT (OUTPATIENT)
Dept: PULMONOLOGY | Facility: CLINIC | Age: 65
End: 2025-03-19

## 2025-04-14 ENCOUNTER — APPOINTMENT (OUTPATIENT)
Dept: PULMONOLOGY | Facility: CLINIC | Age: 65
End: 2025-04-14